# Patient Record
Sex: MALE | Race: WHITE | NOT HISPANIC OR LATINO | URBAN - METROPOLITAN AREA
[De-identification: names, ages, dates, MRNs, and addresses within clinical notes are randomized per-mention and may not be internally consistent; named-entity substitution may affect disease eponyms.]

---

## 2022-09-08 ENCOUNTER — HOSPITAL ENCOUNTER (OUTPATIENT)
Dept: LAB | Facility: MEDICAL CENTER | Age: 57
End: 2022-09-08
Attending: FAMILY MEDICINE
Payer: COMMERCIAL

## 2022-09-08 ENCOUNTER — OFFICE VISIT (OUTPATIENT)
Dept: URGENT CARE | Facility: PHYSICIAN GROUP | Age: 57
End: 2022-09-08

## 2022-09-08 VITALS
BODY MASS INDEX: 22.62 KG/M2 | HEART RATE: 69 BPM | WEIGHT: 158 LBS | SYSTOLIC BLOOD PRESSURE: 150 MMHG | HEIGHT: 70 IN | RESPIRATION RATE: 16 BRPM | OXYGEN SATURATION: 97 % | TEMPERATURE: 98.4 F | DIASTOLIC BLOOD PRESSURE: 82 MMHG

## 2022-09-08 DIAGNOSIS — K62.5 GASTROINTESTINAL HEMORRHAGE ASSOCIATED WITH ANORECTAL SOURCE: ICD-10-CM

## 2022-09-08 DIAGNOSIS — R03.0 ELEVATED BLOOD PRESSURE READING: ICD-10-CM

## 2022-09-08 LAB
BASOPHILS # BLD AUTO: 0.5 % (ref 0–1.8)
BASOPHILS # BLD: 0.04 K/UL (ref 0–0.12)
EOSINOPHIL # BLD AUTO: 0.11 K/UL (ref 0–0.51)
EOSINOPHIL NFR BLD: 1.4 % (ref 0–6.9)
ERYTHROCYTE [DISTWIDTH] IN BLOOD BY AUTOMATED COUNT: 42.8 FL (ref 35.9–50)
HCT VFR BLD AUTO: 45.1 % (ref 42–52)
HGB BLD-MCNC: 15.3 G/DL (ref 14–18)
IMM GRANULOCYTES # BLD AUTO: 0.02 K/UL (ref 0–0.11)
IMM GRANULOCYTES NFR BLD AUTO: 0.2 % (ref 0–0.9)
LYMPHOCYTES # BLD AUTO: 1.42 K/UL (ref 1–4.8)
LYMPHOCYTES NFR BLD: 17.5 % (ref 22–41)
MCH RBC QN AUTO: 31.9 PG (ref 27–33)
MCHC RBC AUTO-ENTMCNC: 33.9 G/DL (ref 33.7–35.3)
MCV RBC AUTO: 94.2 FL (ref 81.4–97.8)
MONOCYTES # BLD AUTO: 0.7 K/UL (ref 0–0.85)
MONOCYTES NFR BLD AUTO: 8.6 % (ref 0–13.4)
NEUTROPHILS # BLD AUTO: 5.84 K/UL (ref 1.82–7.42)
NEUTROPHILS NFR BLD: 71.8 % (ref 44–72)
NRBC # BLD AUTO: 0 K/UL
NRBC BLD-RTO: 0 /100 WBC
PLATELET # BLD AUTO: 213 K/UL (ref 164–446)
PMV BLD AUTO: 11.3 FL (ref 9–12.9)
RBC # BLD AUTO: 4.79 M/UL (ref 4.7–6.1)
WBC # BLD AUTO: 8.1 K/UL (ref 4.8–10.8)

## 2022-09-08 PROCEDURE — 36415 COLL VENOUS BLD VENIPUNCTURE: CPT

## 2022-09-08 PROCEDURE — 85025 COMPLETE CBC W/AUTO DIFF WBC: CPT

## 2022-09-08 PROCEDURE — 99203 OFFICE O/P NEW LOW 30 MIN: CPT | Performed by: FAMILY MEDICINE

## 2022-09-08 NOTE — PROGRESS NOTES
Chief Complaint   Patient presents with    Bloody Stools     Last night had a lot of blood in stool         HPI:        Pt c/o BRBPR x 2 d.   Denies any rectal pain.   No n/v/d.    Had BM last night and there was a large amount of bright red blood.   Denies f/c.   No abd pain.    Screening colonoscopy up to date.           Past medical history was unremarkable and not pertinent to current issue     Family hx was reviewed - no   past family hx of colon cancer            Review of Systems   Constitutional: Negative for fever, chills and malaise/fatigue.   Eyes: Negative for vision changes, d/c.    Respiratory: Negative for cough and sputum production.    Cardiovascular: Negative for chest pain and palpitations.   Gastrointestinal: Negative for nausea, vomiting, abdominal pain, diarrhea and constipation.   Genitourinary: Negative for dysuria, urgency and frequency.   Skin: Negative for rash or  itching.   Neurological: Negative for dizziness and tingling.   Psychiatric/Behavioral: Negative for depression.   Hematologic/lymphatic - denies bruising or excessive bleeding  All other systems reviewed and are negative.        OBJECTIVE     HEENT - PERRLA, EOMI  Neuro - alert and oriented x3. CN 2-12 grossly intact.  Lungs - CTA. No wheezes, rhonchi or rales.  Heart - regular rate and rhythm without murmur.  Abdomen - soft and non-tender, bowel sounds active x4.   Rectal:   tone normal.   No fissures noted.   No masses.   No external hemorrhoids.       Hospital Outpatient Visit on 09/08/2022   Component Date Value Ref Range Status    WBC 09/08/2022 8.1  4.8 - 10.8 K/uL Final    RBC 09/08/2022 4.79  4.70 - 6.10 M/uL Final    Hemoglobin 09/08/2022 15.3  14.0 - 18.0 g/dL Final    Hematocrit 09/08/2022 45.1  42.0 - 52.0 % Final    MCV 09/08/2022 94.2  81.4 - 97.8 fL Final    MCH 09/08/2022 31.9  27.0 - 33.0 pg Final    MCHC 09/08/2022 33.9  33.7 - 35.3 g/dL Final    RDW 09/08/2022 42.8  35.9 - 50.0 fL Final    Platelet Count  09/08/2022 213  164 - 446 K/uL Final    MPV 09/08/2022 11.3  9.0 - 12.9 fL Final    Neutrophils-Polys 09/08/2022 71.80  44.00 - 72.00 % Final    Lymphocytes 09/08/2022 17.50 (A) 22.00 - 41.00 % Final    Monocytes 09/08/2022 8.60  0.00 - 13.40 % Final    Eosinophils 09/08/2022 1.40  0.00 - 6.90 % Final    Basophils 09/08/2022 0.50  0.00 - 1.80 % Final    Immature Granulocytes 09/08/2022 0.20  0.00 - 0.90 % Final    Nucleated RBC 09/08/2022 0.00  /100 WBC Final    Neutrophils (Absolute) 09/08/2022 5.84  1.82 - 7.42 K/uL Final    Includes immature neutrophils, if present.    Lymphs (Absolute) 09/08/2022 1.42  1.00 - 4.80 K/uL Final    Monos (Absolute) 09/08/2022 0.70  0.00 - 0.85 K/uL Final    Eos (Absolute) 09/08/2022 0.11  0.00 - 0.51 K/uL Final    Baso (Absolute) 09/08/2022 0.04  0.00 - 0.12 K/uL Final    Immature Granulocytes (abs) 09/08/2022 0.02  0.00 - 0.11 K/uL Final    NRBC (Absolute) 09/08/2022 0.00  K/uL Final         A/P:    1. Gastrointestinal bleeding      H/H: 15.3/45.1    Pt is not anemic.       Suspect bleeding polyp vs internal hemorrhoid.    Tumor is a possibility - but urgent imaging was refused -  pt desires to f/u with his PCP at home, in [place].          Advised to stay well-hydrated and go to the nearest ED if the bleeding becomes severe.     - Referral to Gastroenterology           2. Elevated blood pressure reading  Likely situational  - no hx HTN  Advised f/u PCP